# Patient Record
(demographics unavailable — no encounter records)

---

## 2024-11-04 NOTE — REVIEW OF SYSTEMS
[Abn Vaginal bleeding] : abnormal vaginal bleeding [Negative] : Constitutional [Vomiting] : no vomiting [Nausea] : no nausea

## 2024-11-04 NOTE — PHYSICAL EXAM
[Chaperone Present] : A chaperone was present in the examining room during all aspects of the physical examination [11401] : A chaperone was present during the pelvic exam. [FreeTextEntry2] : CLEVE

## 2024-11-04 NOTE — HISTORY OF PRESENT ILLNESS
[Abnormal Quantity] : abnormal quantity [Abnormal Duration] : abnormal duration [Heavy Bleeding] : heavy bleeding [FreeTextEntry1] : HERE FOR FU OF HER HEAVY MENSES HAD STARTED THE PILL 2 YEARS AGO, THE BLEEDING RESOLVED, SO SHE STOPPED IT. PERIODS VERY HEAVY OVER THE LAST 3 MTHS RESTARTED DOUBLE DOSE PILL 2 NIGHTS AGO AND THE BLEEDING HAS SLOWED DOWNTOLD HER TO DECREASE TO 1 PILL/DAY AND TO RETURN IN 3 MTHS OR PRN [TextBox_28] : OVER THE LAST 3 CYCLES

## 2025-04-14 NOTE — PHYSICAL EXAM
[Chaperoned Physical Exam] : A chaperone was present in the examining room during all aspects of the physical examination. [MA] : MA [Soft] : soft [Non-tender] : non-tender [Non-distended] : non-distended [FreeTextEntry2] : CLEVE

## 2025-04-14 NOTE — REVIEW OF SYSTEMS
[Abn Vaginal bleeding] : abnormal vaginal bleeding [Pelvic pain] : pelvic pain [Negative] : Constitutional [Vomiting] : no vomiting [Nausea] : no nausea

## 2025-04-14 NOTE — HISTORY OF PRESENT ILLNESS
[FreeTextEntry1] : HERE DUE TO SEVERE CRAMPS SINCE MORNING DAY 12 OF HER PILL PERIODS LASTS ABOUT A WEEK AND ARE HEAVY

## 2025-04-14 NOTE — PROCEDURE
[Pelvic Pain] : pelvic pain [Abnormal Uterine Bleeding] : abnormal uterine bleeding [Transvaginal Ultrasound] : transvaginal ultrasound [FreeTextEntry3] : NO CYSTS NO FREE FLUID  [FreeTextEntry5] : 23.4 CC 2 MM [FreeTextEntry7] : 9.1 CC [FreeTextEntry8] : 13.6 CC

## 2025-05-19 NOTE — REVIEW OF SYSTEMS
[Abn Vaginal bleeding] : abnormal vaginal bleeding [Pelvic pain] : pelvic pain [Back Pain] : back pain [Negative] : Constitutional [Vomiting] : no vomiting [Nausea] : no nausea

## 2025-05-19 NOTE — PHYSICAL EXAM
[Chaperoned Physical Exam] : A chaperone was present in the examining room during all aspects of the physical examination. [MA] : MA [Soft] : soft [FreeTextEntry2] : goldie [FreeTextEntry7] : tender, suprapubic

## 2025-05-19 NOTE — HISTORY OF PRESENT ILLNESS
[No] : Patient does not have concerns regarding sex [Never active] : never active [FreeTextEntry1] : severe dysmenorrhea and heavy menses despite the 35 ug pill. Patient describes the pain as 8/10...and it is interfering with her ability to study and thrive in the academic setting